# Patient Record
Sex: MALE | Race: WHITE | NOT HISPANIC OR LATINO
[De-identification: names, ages, dates, MRNs, and addresses within clinical notes are randomized per-mention and may not be internally consistent; named-entity substitution may affect disease eponyms.]

---

## 2019-10-30 PROBLEM — Z00.00 ENCOUNTER FOR PREVENTIVE HEALTH EXAMINATION: Status: ACTIVE | Noted: 2019-10-30

## 2020-07-06 ENCOUNTER — APPOINTMENT (OUTPATIENT)
Dept: FAMILY MEDICINE | Facility: CLINIC | Age: 25
End: 2020-07-06

## 2022-03-18 ENCOUNTER — TRANSCRIPTION ENCOUNTER (OUTPATIENT)
Age: 27
End: 2022-03-18

## 2022-03-19 ENCOUNTER — INPATIENT (INPATIENT)
Facility: HOSPITAL | Age: 27
LOS: 1 days | Discharge: HOME | DRG: 853 | End: 2022-03-21
Attending: SURGERY | Admitting: SURGERY
Payer: COMMERCIAL

## 2022-03-19 ENCOUNTER — RESULT REVIEW (OUTPATIENT)
Age: 27
End: 2022-03-19

## 2022-03-19 VITALS
SYSTOLIC BLOOD PRESSURE: 109 MMHG | DIASTOLIC BLOOD PRESSURE: 69 MMHG | HEIGHT: 69 IN | TEMPERATURE: 99 F | HEART RATE: 93 BPM | OXYGEN SATURATION: 96 % | WEIGHT: 220.02 LBS | RESPIRATION RATE: 20 BRPM

## 2022-03-19 DIAGNOSIS — K35.80 UNSPECIFIED ACUTE APPENDICITIS: ICD-10-CM

## 2022-03-19 LAB
ALBUMIN SERPL ELPH-MCNC: 5.1 G/DL — SIGNIFICANT CHANGE UP (ref 3.3–5.2)
ALP SERPL-CCNC: 115 U/L — SIGNIFICANT CHANGE UP (ref 40–120)
ALT FLD-CCNC: 31 U/L — SIGNIFICANT CHANGE UP
ANION GAP SERPL CALC-SCNC: 17 MMOL/L — SIGNIFICANT CHANGE UP (ref 5–17)
APPEARANCE UR: CLEAR — SIGNIFICANT CHANGE UP
APTT BLD: 25.4 SEC — LOW (ref 27.5–35.5)
AST SERPL-CCNC: 18 U/L — SIGNIFICANT CHANGE UP
BACTERIA # UR AUTO: ABNORMAL
BASOPHILS # BLD AUTO: 0.04 K/UL — SIGNIFICANT CHANGE UP (ref 0–0.2)
BASOPHILS NFR BLD AUTO: 0.2 % — SIGNIFICANT CHANGE UP (ref 0–2)
BILIRUB SERPL-MCNC: 1.2 MG/DL — SIGNIFICANT CHANGE UP (ref 0.4–2)
BILIRUB UR-MCNC: NEGATIVE — SIGNIFICANT CHANGE UP
BLD GP AB SCN SERPL QL: SIGNIFICANT CHANGE UP
BUN SERPL-MCNC: 13.6 MG/DL — SIGNIFICANT CHANGE UP (ref 8–20)
CALCIUM SERPL-MCNC: 10.6 MG/DL — HIGH (ref 8.6–10.2)
CHLORIDE SERPL-SCNC: 99 MMOL/L — SIGNIFICANT CHANGE UP (ref 98–107)
CO2 SERPL-SCNC: 24 MMOL/L — SIGNIFICANT CHANGE UP (ref 22–29)
COLOR SPEC: YELLOW — SIGNIFICANT CHANGE UP
CREAT SERPL-MCNC: 0.99 MG/DL — SIGNIFICANT CHANGE UP (ref 0.5–1.3)
DIFF PNL FLD: NEGATIVE — SIGNIFICANT CHANGE UP
EGFR: 108 ML/MIN/1.73M2 — SIGNIFICANT CHANGE UP
EOSINOPHIL # BLD AUTO: 0.02 K/UL — SIGNIFICANT CHANGE UP (ref 0–0.5)
EOSINOPHIL NFR BLD AUTO: 0.1 % — SIGNIFICANT CHANGE UP (ref 0–6)
EPI CELLS # UR: NEGATIVE — SIGNIFICANT CHANGE UP
GLUCOSE SERPL-MCNC: 162 MG/DL — HIGH (ref 70–99)
GLUCOSE UR QL: NEGATIVE MG/DL — SIGNIFICANT CHANGE UP
HCT VFR BLD CALC: 48.2 % — SIGNIFICANT CHANGE UP (ref 39–50)
HGB BLD-MCNC: 16.2 G/DL — SIGNIFICANT CHANGE UP (ref 13–17)
IMM GRANULOCYTES NFR BLD AUTO: 0.4 % — SIGNIFICANT CHANGE UP (ref 0–1.5)
INR BLD: 1.28 RATIO — HIGH (ref 0.88–1.16)
KETONES UR-MCNC: NEGATIVE — SIGNIFICANT CHANGE UP
LEUKOCYTE ESTERASE UR-ACNC: NEGATIVE — SIGNIFICANT CHANGE UP
LIDOCAIN IGE QN: 25 U/L — SIGNIFICANT CHANGE UP (ref 22–51)
LYMPHOCYTES # BLD AUTO: 12.6 % — LOW (ref 13–44)
LYMPHOCYTES # BLD AUTO: 2.1 K/UL — SIGNIFICANT CHANGE UP (ref 1–3.3)
MCHC RBC-ENTMCNC: 31.8 PG — SIGNIFICANT CHANGE UP (ref 27–34)
MCHC RBC-ENTMCNC: 33.6 GM/DL — SIGNIFICANT CHANGE UP (ref 32–36)
MCV RBC AUTO: 94.7 FL — SIGNIFICANT CHANGE UP (ref 80–100)
MONOCYTES # BLD AUTO: 0.89 K/UL — SIGNIFICANT CHANGE UP (ref 0–0.9)
MONOCYTES NFR BLD AUTO: 5.3 % — SIGNIFICANT CHANGE UP (ref 2–14)
NEUTROPHILS # BLD AUTO: 13.56 K/UL — HIGH (ref 1.8–7.4)
NEUTROPHILS NFR BLD AUTO: 81.4 % — HIGH (ref 43–77)
NITRITE UR-MCNC: NEGATIVE — SIGNIFICANT CHANGE UP
PH UR: 6 — SIGNIFICANT CHANGE UP (ref 5–8)
PLATELET # BLD AUTO: 348 K/UL — SIGNIFICANT CHANGE UP (ref 150–400)
POTASSIUM SERPL-MCNC: 4.2 MMOL/L — SIGNIFICANT CHANGE UP (ref 3.5–5.3)
POTASSIUM SERPL-SCNC: 4.2 MMOL/L — SIGNIFICANT CHANGE UP (ref 3.5–5.3)
PROT SERPL-MCNC: 8.5 G/DL — SIGNIFICANT CHANGE UP (ref 6.6–8.7)
PROT UR-MCNC: 15 MG/DL
PROTHROM AB SERPL-ACNC: 14.9 SEC — HIGH (ref 10.5–13.4)
RAPID RVP RESULT: SIGNIFICANT CHANGE UP
RBC # BLD: 5.09 M/UL — SIGNIFICANT CHANGE UP (ref 4.2–5.8)
RBC # FLD: 12.1 % — SIGNIFICANT CHANGE UP (ref 10.3–14.5)
RBC CASTS # UR COMP ASSIST: NEGATIVE /HPF — SIGNIFICANT CHANGE UP (ref 0–4)
SARS-COV-2 RNA SPEC QL NAA+PROBE: SIGNIFICANT CHANGE UP
SODIUM SERPL-SCNC: 140 MMOL/L — SIGNIFICANT CHANGE UP (ref 135–145)
SP GR SPEC: 1.01 — SIGNIFICANT CHANGE UP (ref 1.01–1.02)
UROBILINOGEN FLD QL: NEGATIVE MG/DL — SIGNIFICANT CHANGE UP
WBC # BLD: 16.68 K/UL — HIGH (ref 3.8–10.5)
WBC # FLD AUTO: 16.68 K/UL — HIGH (ref 3.8–10.5)
WBC UR QL: SIGNIFICANT CHANGE UP /HPF (ref 0–5)

## 2022-03-19 PROCEDURE — 99285 EMERGENCY DEPT VISIT HI MDM: CPT

## 2022-03-19 PROCEDURE — 88304 TISSUE EXAM BY PATHOLOGIST: CPT | Mod: 26

## 2022-03-19 PROCEDURE — 74177 CT ABD & PELVIS W/CONTRAST: CPT | Mod: 26,MA

## 2022-03-19 DEVICE — STAPLER COVIDIEN TRI-STAPLE CURVED 45MM TAN RELOAD: Type: IMPLANTABLE DEVICE | Status: FUNCTIONAL

## 2022-03-19 RX ORDER — PIPERACILLIN AND TAZOBACTAM 4; .5 G/20ML; G/20ML
3.38 INJECTION, POWDER, LYOPHILIZED, FOR SOLUTION INTRAVENOUS EVERY 8 HOURS
Refills: 0 | Status: DISCONTINUED | OUTPATIENT
Start: 2022-03-19 | End: 2022-03-21

## 2022-03-19 RX ORDER — ENOXAPARIN SODIUM 100 MG/ML
40 INJECTION SUBCUTANEOUS EVERY 24 HOURS
Refills: 0 | Status: DISCONTINUED | OUTPATIENT
Start: 2022-03-19 | End: 2022-03-21

## 2022-03-19 RX ORDER — KETOROLAC TROMETHAMINE 30 MG/ML
15 SYRINGE (ML) INJECTION EVERY 6 HOURS
Refills: 0 | Status: DISCONTINUED | OUTPATIENT
Start: 2022-03-19 | End: 2022-03-21

## 2022-03-19 RX ORDER — HYDROMORPHONE HYDROCHLORIDE 2 MG/ML
0.5 INJECTION INTRAMUSCULAR; INTRAVENOUS; SUBCUTANEOUS EVERY 6 HOURS
Refills: 0 | Status: DISCONTINUED | OUTPATIENT
Start: 2022-03-19 | End: 2022-03-21

## 2022-03-19 RX ORDER — MORPHINE SULFATE 50 MG/1
4 CAPSULE, EXTENDED RELEASE ORAL ONCE
Refills: 0 | Status: DISCONTINUED | OUTPATIENT
Start: 2022-03-19 | End: 2022-03-19

## 2022-03-19 RX ORDER — LAMOTRIGINE 25 MG/1
300 TABLET, ORALLY DISINTEGRATING ORAL DAILY
Refills: 0 | Status: DISCONTINUED | OUTPATIENT
Start: 2022-03-19 | End: 2022-03-21

## 2022-03-19 RX ORDER — HYDROMORPHONE HYDROCHLORIDE 2 MG/ML
1 INJECTION INTRAMUSCULAR; INTRAVENOUS; SUBCUTANEOUS EVERY 6 HOURS
Refills: 0 | Status: DISCONTINUED | OUTPATIENT
Start: 2022-03-19 | End: 2022-03-21

## 2022-03-19 RX ORDER — SODIUM CHLORIDE 9 MG/ML
1000 INJECTION, SOLUTION INTRAVENOUS
Refills: 0 | Status: DISCONTINUED | OUTPATIENT
Start: 2022-03-19 | End: 2022-03-21

## 2022-03-19 RX ORDER — HYDROMORPHONE HYDROCHLORIDE 2 MG/ML
0.5 INJECTION INTRAMUSCULAR; INTRAVENOUS; SUBCUTANEOUS
Refills: 0 | Status: DISCONTINUED | OUTPATIENT
Start: 2022-03-19 | End: 2022-03-19

## 2022-03-19 RX ORDER — OXCARBAZEPINE 300 MG/1
450 TABLET, FILM COATED ORAL DAILY
Refills: 0 | Status: DISCONTINUED | OUTPATIENT
Start: 2022-03-19 | End: 2022-03-19

## 2022-03-19 RX ORDER — OXCARBAZEPINE 300 MG/1
450 TABLET, FILM COATED ORAL
Qty: 0 | Refills: 0 | DISCHARGE

## 2022-03-19 RX ORDER — DESVENLAFAXINE 50 MG/1
150 TABLET, EXTENDED RELEASE ORAL
Qty: 0 | Refills: 0 | DISCHARGE

## 2022-03-19 RX ORDER — PIPERACILLIN AND TAZOBACTAM 4; .5 G/20ML; G/20ML
3.38 INJECTION, POWDER, LYOPHILIZED, FOR SOLUTION INTRAVENOUS ONCE
Refills: 0 | Status: COMPLETED | OUTPATIENT
Start: 2022-03-19 | End: 2022-03-19

## 2022-03-19 RX ORDER — OXCARBAZEPINE 300 MG/1
150 TABLET, FILM COATED ORAL ONCE
Refills: 0 | Status: DISCONTINUED | OUTPATIENT
Start: 2022-03-19 | End: 2022-03-21

## 2022-03-19 RX ORDER — LAMOTRIGINE 25 MG/1
300 TABLET, ORALLY DISINTEGRATING ORAL
Qty: 0 | Refills: 0 | DISCHARGE

## 2022-03-19 RX ORDER — FENTANYL CITRATE 50 UG/ML
25 INJECTION INTRAVENOUS
Refills: 0 | Status: DISCONTINUED | OUTPATIENT
Start: 2022-03-19 | End: 2022-03-19

## 2022-03-19 RX ORDER — ACETAMINOPHEN 500 MG
975 TABLET ORAL EVERY 6 HOURS
Refills: 0 | Status: DISCONTINUED | OUTPATIENT
Start: 2022-03-19 | End: 2022-03-21

## 2022-03-19 RX ORDER — OXCARBAZEPINE 300 MG/1
150 TABLET, FILM COATED ORAL ONCE
Refills: 0 | Status: COMPLETED | OUTPATIENT
Start: 2022-03-20 | End: 2022-03-20

## 2022-03-19 RX ORDER — SODIUM CHLORIDE 9 MG/ML
1000 INJECTION, SOLUTION INTRAVENOUS
Refills: 0 | Status: DISCONTINUED | OUTPATIENT
Start: 2022-03-19 | End: 2022-03-19

## 2022-03-19 RX ORDER — HYDROMORPHONE HYDROCHLORIDE 2 MG/ML
0.5 INJECTION INTRAMUSCULAR; INTRAVENOUS; SUBCUTANEOUS EVERY 4 HOURS
Refills: 0 | Status: DISCONTINUED | OUTPATIENT
Start: 2022-03-19 | End: 2022-03-19

## 2022-03-19 RX ORDER — KETOROLAC TROMETHAMINE 30 MG/ML
15 SYRINGE (ML) INJECTION ONCE
Refills: 0 | Status: DISCONTINUED | OUTPATIENT
Start: 2022-03-19 | End: 2022-03-19

## 2022-03-19 RX ORDER — ONDANSETRON 8 MG/1
4 TABLET, FILM COATED ORAL ONCE
Refills: 0 | Status: COMPLETED | OUTPATIENT
Start: 2022-03-19 | End: 2022-03-19

## 2022-03-19 RX ORDER — HYDROMORPHONE HYDROCHLORIDE 2 MG/ML
1 INJECTION INTRAMUSCULAR; INTRAVENOUS; SUBCUTANEOUS EVERY 4 HOURS
Refills: 0 | Status: DISCONTINUED | OUTPATIENT
Start: 2022-03-19 | End: 2022-03-19

## 2022-03-19 RX ORDER — SENNA PLUS 8.6 MG/1
2 TABLET ORAL AT BEDTIME
Refills: 0 | Status: DISCONTINUED | OUTPATIENT
Start: 2022-03-19 | End: 2022-03-21

## 2022-03-19 RX ORDER — LIDOCAINE 4 G/100G
1 CREAM TOPICAL DAILY
Refills: 0 | Status: DISCONTINUED | OUTPATIENT
Start: 2022-03-19 | End: 2022-03-21

## 2022-03-19 RX ORDER — SODIUM CHLORIDE 9 MG/ML
1000 INJECTION INTRAMUSCULAR; INTRAVENOUS; SUBCUTANEOUS ONCE
Refills: 0 | Status: COMPLETED | OUTPATIENT
Start: 2022-03-19 | End: 2022-03-19

## 2022-03-19 RX ORDER — BREXPIPRAZOLE 0.25 MG/1
1 TABLET ORAL
Qty: 0 | Refills: 0 | DISCHARGE

## 2022-03-19 RX ORDER — ONDANSETRON 8 MG/1
4 TABLET, FILM COATED ORAL EVERY 6 HOURS
Refills: 0 | Status: DISCONTINUED | OUTPATIENT
Start: 2022-03-19 | End: 2022-03-21

## 2022-03-19 RX ORDER — LANOLIN ALCOHOL/MO/W.PET/CERES
3 CREAM (GRAM) TOPICAL AT BEDTIME
Refills: 0 | Status: DISCONTINUED | OUTPATIENT
Start: 2022-03-19 | End: 2022-03-21

## 2022-03-19 RX ORDER — HYDROMORPHONE HYDROCHLORIDE 2 MG/ML
0.4 INJECTION INTRAMUSCULAR; INTRAVENOUS; SUBCUTANEOUS
Refills: 0 | Status: DISCONTINUED | OUTPATIENT
Start: 2022-03-19 | End: 2022-03-19

## 2022-03-19 RX ORDER — OXCARBAZEPINE 300 MG/1
300 TABLET, FILM COATED ORAL ONCE
Refills: 0 | Status: COMPLETED | OUTPATIENT
Start: 2022-03-20 | End: 2022-03-20

## 2022-03-19 RX ORDER — SODIUM CHLORIDE 9 MG/ML
2000 INJECTION, SOLUTION INTRAVENOUS ONCE
Refills: 0 | Status: DISCONTINUED | OUTPATIENT
Start: 2022-03-19 | End: 2022-03-19

## 2022-03-19 RX ORDER — TRAMADOL HYDROCHLORIDE 50 MG/1
50 TABLET ORAL EVERY 6 HOURS
Refills: 0 | Status: DISCONTINUED | OUTPATIENT
Start: 2022-03-19 | End: 2022-03-21

## 2022-03-19 RX ORDER — ACETAMINOPHEN 500 MG
650 TABLET ORAL EVERY 6 HOURS
Refills: 0 | Status: DISCONTINUED | OUTPATIENT
Start: 2022-03-19 | End: 2022-03-19

## 2022-03-19 RX ORDER — ACETAMINOPHEN 500 MG
650 TABLET ORAL ONCE
Refills: 0 | Status: COMPLETED | OUTPATIENT
Start: 2022-03-19 | End: 2022-03-19

## 2022-03-19 RX ADMIN — SODIUM CHLORIDE 125 MILLILITER(S): 9 INJECTION, SOLUTION INTRAVENOUS at 22:55

## 2022-03-19 RX ADMIN — Medication 15 MILLIGRAM(S): at 13:47

## 2022-03-19 RX ADMIN — PIPERACILLIN AND TAZOBACTAM 25 GRAM(S): 4; .5 INJECTION, POWDER, LYOPHILIZED, FOR SOLUTION INTRAVENOUS at 14:11

## 2022-03-19 RX ADMIN — PIPERACILLIN AND TAZOBACTAM 25 GRAM(S): 4; .5 INJECTION, POWDER, LYOPHILIZED, FOR SOLUTION INTRAVENOUS at 21:43

## 2022-03-19 RX ADMIN — HYDROMORPHONE HYDROCHLORIDE 0.5 MILLIGRAM(S): 2 INJECTION INTRAMUSCULAR; INTRAVENOUS; SUBCUTANEOUS at 14:30

## 2022-03-19 RX ADMIN — SODIUM CHLORIDE 125 MILLILITER(S): 9 INJECTION, SOLUTION INTRAVENOUS at 15:28

## 2022-03-19 RX ADMIN — HYDROMORPHONE HYDROCHLORIDE 0.4 MILLIGRAM(S): 2 INJECTION INTRAMUSCULAR; INTRAVENOUS; SUBCUTANEOUS at 15:27

## 2022-03-19 RX ADMIN — OXCARBAZEPINE 450 MILLIGRAM(S): 300 TABLET, FILM COATED ORAL at 13:48

## 2022-03-19 RX ADMIN — Medication 3 MILLIGRAM(S): at 22:55

## 2022-03-19 RX ADMIN — HYDROMORPHONE HYDROCHLORIDE 1 MILLIGRAM(S): 2 INJECTION INTRAMUSCULAR; INTRAVENOUS; SUBCUTANEOUS at 21:01

## 2022-03-19 RX ADMIN — Medication 15 MILLIGRAM(S): at 22:56

## 2022-03-19 RX ADMIN — HYDROMORPHONE HYDROCHLORIDE 1 MILLIGRAM(S): 2 INJECTION INTRAMUSCULAR; INTRAVENOUS; SUBCUTANEOUS at 19:42

## 2022-03-19 RX ADMIN — Medication 15 MILLIGRAM(S): at 23:22

## 2022-03-19 RX ADMIN — SENNA PLUS 2 TABLET(S): 8.6 TABLET ORAL at 21:40

## 2022-03-19 RX ADMIN — Medication 650 MILLIGRAM(S): at 08:51

## 2022-03-19 RX ADMIN — Medication 975 MILLIGRAM(S): at 17:44

## 2022-03-19 RX ADMIN — ENOXAPARIN SODIUM 40 MILLIGRAM(S): 100 INJECTION SUBCUTANEOUS at 21:40

## 2022-03-19 RX ADMIN — LAMOTRIGINE 300 MILLIGRAM(S): 25 TABLET, ORALLY DISINTEGRATING ORAL at 13:49

## 2022-03-19 RX ADMIN — Medication 15 MILLIGRAM(S): at 04:00

## 2022-03-19 RX ADMIN — Medication 975 MILLIGRAM(S): at 13:47

## 2022-03-19 RX ADMIN — Medication 15 MILLIGRAM(S): at 17:44

## 2022-03-19 RX ADMIN — LIDOCAINE 1 PATCH: 4 CREAM TOPICAL at 13:58

## 2022-03-19 RX ADMIN — ONDANSETRON 4 MILLIGRAM(S): 8 TABLET, FILM COATED ORAL at 04:00

## 2022-03-19 RX ADMIN — HYDROMORPHONE HYDROCHLORIDE 0.4 MILLIGRAM(S): 2 INJECTION INTRAMUSCULAR; INTRAVENOUS; SUBCUTANEOUS at 16:00

## 2022-03-19 RX ADMIN — SODIUM CHLORIDE 1000 MILLILITER(S): 9 INJECTION INTRAMUSCULAR; INTRAVENOUS; SUBCUTANEOUS at 04:00

## 2022-03-19 RX ADMIN — Medication 650 MILLIGRAM(S): at 08:17

## 2022-03-19 RX ADMIN — Medication 30 MILLILITER(S): at 23:20

## 2022-03-19 RX ADMIN — HYDROMORPHONE HYDROCHLORIDE 0.5 MILLIGRAM(S): 2 INJECTION INTRAMUSCULAR; INTRAVENOUS; SUBCUTANEOUS at 14:02

## 2022-03-19 RX ADMIN — Medication 975 MILLIGRAM(S): at 23:22

## 2022-03-19 RX ADMIN — Medication 975 MILLIGRAM(S): at 22:55

## 2022-03-19 RX ADMIN — PIPERACILLIN AND TAZOBACTAM 200 GRAM(S): 4; .5 INJECTION, POWDER, LYOPHILIZED, FOR SOLUTION INTRAVENOUS at 07:06

## 2022-03-19 NOTE — BRIEF OPERATIVE NOTE - OPERATION/FINDINGS
Perforated gangrenous acute appendicitis with early phlegmon. Appendix stapled at healthy base with endoGIA 60 gold. Copious irrigation and suction of abdomen and pelvis

## 2022-03-19 NOTE — ED PROVIDER NOTE - CLINICAL SUMMARY MEDICAL DECISION MAKING FREE TEXT BOX
26 year old male with pmhx of alcohol abuse, bipolar presents c/o abdominal pain x 2 days.   - CT   - labs  -pain control   - urine

## 2022-03-19 NOTE — H&P ADULT - ASSESSMENT
27 y/o M with clinical and radiographic findings consistent with acute appendicitis. Hemodynamically stable, afebrile.    Plan:  Admit to Surgery, Dr. Marc  OR today for lap, possible open, appendectomy  Received Zosyn in ED  NPO/IVF  Pain meds  DVT ppx: SCDs

## 2022-03-19 NOTE — BRIEF OPERATIVE NOTE - NSICDXBRIEFPOSTOP_GEN_ALL_CORE_FT
POST-OP DIAGNOSIS:  Acute appendicitis with perforation and localized peritonitis 19-Mar-2022 12:00:33  Roberto Carlos Hauser

## 2022-03-19 NOTE — H&P ADULT - NSHPPHYSICALEXAM_GEN_ALL_CORE
General: NAD, AOx3, uncomfortable on examination  HEENT: PERRLA, EOMI, moist mucous membranes  Neck: supple, nontender  Cardiovascular: heart RRR, no murmurs  Respiratory: no respiratory distress, CTAB  Abdomen: soft, moderate RLQ and suprapubic tenderness to palpation, nondistended. No guarding or rebound. Normal bowel sounds.  Extremities: no peripheral edema. Normal ROM.  Integumentary: warm, no rash  Neuro: no motor or sensory deficits
none

## 2022-03-19 NOTE — ED PROVIDER NOTE - ATTENDING CONTRIBUTION TO CARE
25yo male with pmh of bipolar and alcohol abuse presents with RLQ pain/n/v. Pt with appendicitis, abx, surgical admission

## 2022-03-19 NOTE — H&P ADULT - HISTORY OF PRESENT ILLNESS
25 y/o M h/o depression presents to ED with lower abdominal pain. Patient states it woke him from sleep 2 nights ago, describe as constant pain. Pain continued to worsen and again woke him from sleep last night after which he presented to ED. Nausea and vomiting. No fever, but notes chills. No chest pain or dyspnea.     PMH: depression  PSH: none  Medications: Pristiq, Rexulti, Lamictal, Trileptal  Allergies: NKDA, NKA  Social: no tobacco use, former alcohol use (in rehab, no alcohol for 3 months), medical marijuana use

## 2022-03-19 NOTE — PATIENT PROFILE ADULT - FALL HARM RISK - HARM RISK INTERVENTIONS

## 2022-03-19 NOTE — ED ADULT TRIAGE NOTE - CHIEF COMPLAINT QUOTE
Patient ambulated into ED with steady gait, Pt c/o RLQ abd pain started last night, nausea. Pt is diaphoretic in triage.

## 2022-03-19 NOTE — ED PROVIDER NOTE - OBJECTIVE STATEMENT
26 year old male with pmhx of alcohol abuse, bipolar presents c/o abdominal pain x 2 days. Pt states pain started last night diffusely, now localized to RLQ. he admits to nausea and vomiting. describes pain as sharp stabbing radiating to groin. He denies chest pain, sob, fever/chills, dysuria, hematuria, trauma/injury

## 2022-03-19 NOTE — CHART NOTE - NSCHARTNOTEFT_GEN_A_CORE
Post operative check     Subjective: Patient is s/p lap appendectomy for acute appendicitis with perforation and localized peritonitis. He was seen and evaluated at bedside. He has some abdominal pain, much improved since pre operatively. He is voiding spontaneously, tolerating liquids, and plans to eat dinner. Denies fever, chills, chest pain, palpitations, SOB, difficulty breathing, dysuria, hematuria, N/V/D.     STATUS POST:  Laparoscopic appendectomy     POST OPERATIVE DAY #: 0    MEDICATIONS  (STANDING):  acetaminophen     Tablet .. 975 milliGRAM(s) Oral every 6 hours  enoxaparin Injectable 40 milliGRAM(s) SubCutaneous every 24 hours  ketorolac   Injectable 15 milliGRAM(s) IV Push every 6 hours  lactated ringers. 1000 milliLiter(s) (125 mL/Hr) IV Continuous <Continuous>  lamoTRIgine 300 milliGRAM(s) Oral daily  lidocaine   4% Patch 1 Patch Transdermal daily  OXcarbazepine Oral Liquid - Peds 450 milliGRAM(s) Oral daily  piperacillin/tazobactam IVPB.. 3.375 Gram(s) IV Intermittent every 8 hours  senna 2 Tablet(s) Oral at bedtime    MEDICATIONS  (PRN):  HYDROmorphone  Injectable 0.4 milliGRAM(s) IV Push every 3 hours PRN severe or breakthrough pain  HYDROmorphone  Injectable 1 milliGRAM(s) IV Push every 4 hours PRN Severe Pain (7 - 10)  HYDROmorphone  Injectable 0.5 milliGRAM(s) IV Push every 4 hours PRN Moderate Pain (4 - 6)  ondansetron Injectable 4 milliGRAM(s) IV Push every 6 hours PRN Nausea and/or Vomiting  traMADol 50 milliGRAM(s) Oral every 6 hours PRN mild to moderate pain      Vital Signs Last 24 Hrs  T(C): 37.2 (19 Mar 2022 15:24), Max: 39.3 (19 Mar 2022 07:30)  T(F): 99 (19 Mar 2022 15:24), Max: 102.8 (19 Mar 2022 07:30)  HR: 109 (19 Mar 2022 15:24) (77 - 110)  BP: 123/70 (19 Mar 2022 15:24) (105/54 - 131/79)  BP(mean): --  RR: 20 (19 Mar 2022 15:24) (12 - 20)  SpO2: 96% (19 Mar 2022 15:24) (96% - 100%)    Physical Exam:    Constitutional: NAD  HEENT: PERRL, EOMI  Neck: No JVD, FROM without pain  Respiratory: Respirations non-labored, no accessory muscle use  Gastrointestinal: Soft, + tenderness RLQ, incisions c/d/i   Extremities: No peripheral edema, No cyanosis  Neurological: A&O x 3; without gross deficit  Musculoskeletal: No joint pain, swelling, deformity, or point tenderness; no limitation of movement      LABS:                        16.2   16.68 )-----------( 348      ( 19 Mar 2022 03:57 )             48.2     03-    140  |  99  |  13.6  ----------------------------<  162<H>  4.2   |  24.0  |  0.99    Ca    10.6<H>      19 Mar 2022 03:57    TPro  8.5  /  Alb  5.1  /  TBili  1.2  /  DBili  x   /  AST  18  /  ALT  31  /  AlkPhos  115  03-19    PT/INR - ( 19 Mar 2022 05:06 )   PT: 14.9 sec;   INR: 1.28 ratio         PTT - ( 19 Mar 2022 05:06 )  PTT:25.4 sec  Urinalysis Basic - ( 19 Mar 2022 15:29 )    Color: Yellow / Appearance: Clear / S.015 / pH: x  Gluc: x / Ketone: Negative  / Bili: Negative / Urobili: Negative mg/dL   Blood: x / Protein: 15 mg/dL / Nitrite: Negative   Leuk Esterase: Negative / RBC: Negative /HPF / WBC 0-2 /HPF   Sq Epi: x / Non Sq Epi: Negative / Bacteria: Many      A: Patient is a 25 yo M s/p laparoscopic appendectomy     Plan:   - Continue IV abx   - Regular diet   - Pain control   - Encourage ambulation, OOB   - DVT ppx

## 2022-03-20 ENCOUNTER — TRANSCRIPTION ENCOUNTER (OUTPATIENT)
Age: 27
End: 2022-03-20

## 2022-03-20 LAB
ANION GAP SERPL CALC-SCNC: 12 MMOL/L — SIGNIFICANT CHANGE UP (ref 5–17)
BASOPHILS # BLD AUTO: 0.02 K/UL — SIGNIFICANT CHANGE UP (ref 0–0.2)
BASOPHILS NFR BLD AUTO: 0.2 % — SIGNIFICANT CHANGE UP (ref 0–2)
BUN SERPL-MCNC: 9.3 MG/DL — SIGNIFICANT CHANGE UP (ref 8–20)
CALCIUM SERPL-MCNC: 9.1 MG/DL — SIGNIFICANT CHANGE UP (ref 8.6–10.2)
CHLORIDE SERPL-SCNC: 101 MMOL/L — SIGNIFICANT CHANGE UP (ref 98–107)
CO2 SERPL-SCNC: 25 MMOL/L — SIGNIFICANT CHANGE UP (ref 22–29)
CREAT SERPL-MCNC: 0.72 MG/DL — SIGNIFICANT CHANGE UP (ref 0.5–1.3)
EGFR: 129 ML/MIN/1.73M2 — SIGNIFICANT CHANGE UP
EOSINOPHIL # BLD AUTO: 0.01 K/UL — SIGNIFICANT CHANGE UP (ref 0–0.5)
EOSINOPHIL NFR BLD AUTO: 0.1 % — SIGNIFICANT CHANGE UP (ref 0–6)
GLUCOSE SERPL-MCNC: 108 MG/DL — HIGH (ref 70–99)
HCT VFR BLD CALC: 43.5 % — SIGNIFICANT CHANGE UP (ref 39–50)
HGB BLD-MCNC: 14.1 G/DL — SIGNIFICANT CHANGE UP (ref 13–17)
IMM GRANULOCYTES NFR BLD AUTO: 0.6 % — SIGNIFICANT CHANGE UP (ref 0–1.5)
LYMPHOCYTES # BLD AUTO: 1.67 K/UL — SIGNIFICANT CHANGE UP (ref 1–3.3)
LYMPHOCYTES # BLD AUTO: 16.1 % — SIGNIFICANT CHANGE UP (ref 13–44)
MAGNESIUM SERPL-MCNC: 2.1 MG/DL — SIGNIFICANT CHANGE UP (ref 1.6–2.6)
MCHC RBC-ENTMCNC: 31.8 PG — SIGNIFICANT CHANGE UP (ref 27–34)
MCHC RBC-ENTMCNC: 32.4 GM/DL — SIGNIFICANT CHANGE UP (ref 32–36)
MCV RBC AUTO: 98 FL — SIGNIFICANT CHANGE UP (ref 80–100)
MONOCYTES # BLD AUTO: 0.52 K/UL — SIGNIFICANT CHANGE UP (ref 0–0.9)
MONOCYTES NFR BLD AUTO: 5 % — SIGNIFICANT CHANGE UP (ref 2–14)
NEUTROPHILS # BLD AUTO: 8.12 K/UL — HIGH (ref 1.8–7.4)
NEUTROPHILS NFR BLD AUTO: 78 % — HIGH (ref 43–77)
PHOSPHATE SERPL-MCNC: 2.4 MG/DL — SIGNIFICANT CHANGE UP (ref 2.4–4.7)
PLATELET # BLD AUTO: 213 K/UL — SIGNIFICANT CHANGE UP (ref 150–400)
POTASSIUM SERPL-MCNC: 3.8 MMOL/L — SIGNIFICANT CHANGE UP (ref 3.5–5.3)
POTASSIUM SERPL-SCNC: 3.8 MMOL/L — SIGNIFICANT CHANGE UP (ref 3.5–5.3)
RBC # BLD: 4.44 M/UL — SIGNIFICANT CHANGE UP (ref 4.2–5.8)
RBC # FLD: 12.3 % — SIGNIFICANT CHANGE UP (ref 10.3–14.5)
SODIUM SERPL-SCNC: 138 MMOL/L — SIGNIFICANT CHANGE UP (ref 135–145)
WBC # BLD: 10.4 K/UL — SIGNIFICANT CHANGE UP (ref 3.8–10.5)
WBC # FLD AUTO: 10.4 K/UL — SIGNIFICANT CHANGE UP (ref 3.8–10.5)

## 2022-03-20 RX ORDER — TRAMADOL HYDROCHLORIDE 50 MG/1
1 TABLET ORAL
Qty: 8 | Refills: 0
Start: 2022-03-20

## 2022-03-20 RX ORDER — ACETAMINOPHEN 500 MG
3 TABLET ORAL
Qty: 0 | Refills: 0 | DISCHARGE
Start: 2022-03-20

## 2022-03-20 RX ORDER — POTASSIUM PHOSPHATE, MONOBASIC POTASSIUM PHOSPHATE, DIBASIC 236; 224 MG/ML; MG/ML
15 INJECTION, SOLUTION INTRAVENOUS ONCE
Refills: 0 | Status: COMPLETED | OUTPATIENT
Start: 2022-03-20 | End: 2022-03-20

## 2022-03-20 RX ORDER — METRONIDAZOLE 500 MG
1 TABLET ORAL
Qty: 21 | Refills: 0
Start: 2022-03-20 | End: 2022-03-26

## 2022-03-20 RX ADMIN — Medication 3 MILLIGRAM(S): at 21:49

## 2022-03-20 RX ADMIN — Medication 975 MILLIGRAM(S): at 20:23

## 2022-03-20 RX ADMIN — Medication 975 MILLIGRAM(S): at 21:15

## 2022-03-20 RX ADMIN — Medication 15 MILLIGRAM(S): at 20:24

## 2022-03-20 RX ADMIN — Medication 30 MILLILITER(S): at 14:51

## 2022-03-20 RX ADMIN — ENOXAPARIN SODIUM 40 MILLIGRAM(S): 100 INJECTION SUBCUTANEOUS at 21:49

## 2022-03-20 RX ADMIN — POTASSIUM PHOSPHATE, MONOBASIC POTASSIUM PHOSPHATE, DIBASIC 62.5 MILLIMOLE(S): 236; 224 INJECTION, SOLUTION INTRAVENOUS at 18:32

## 2022-03-20 RX ADMIN — PIPERACILLIN AND TAZOBACTAM 25 GRAM(S): 4; .5 INJECTION, POWDER, LYOPHILIZED, FOR SOLUTION INTRAVENOUS at 14:44

## 2022-03-20 RX ADMIN — SENNA PLUS 2 TABLET(S): 8.6 TABLET ORAL at 21:49

## 2022-03-20 RX ADMIN — Medication 15 MILLIGRAM(S): at 21:15

## 2022-03-20 RX ADMIN — Medication 15 MILLIGRAM(S): at 05:51

## 2022-03-20 RX ADMIN — PIPERACILLIN AND TAZOBACTAM 25 GRAM(S): 4; .5 INJECTION, POWDER, LYOPHILIZED, FOR SOLUTION INTRAVENOUS at 22:43

## 2022-03-20 RX ADMIN — Medication 15 MILLIGRAM(S): at 12:15

## 2022-03-20 RX ADMIN — SODIUM CHLORIDE 125 MILLILITER(S): 9 INJECTION, SOLUTION INTRAVENOUS at 06:22

## 2022-03-20 RX ADMIN — LIDOCAINE 1 PATCH: 4 CREAM TOPICAL at 04:24

## 2022-03-20 RX ADMIN — OXCARBAZEPINE 150 MILLIGRAM(S): 300 TABLET, FILM COATED ORAL at 05:51

## 2022-03-20 RX ADMIN — Medication 975 MILLIGRAM(S): at 11:44

## 2022-03-20 RX ADMIN — ONDANSETRON 4 MILLIGRAM(S): 8 TABLET, FILM COATED ORAL at 18:31

## 2022-03-20 RX ADMIN — TRAMADOL HYDROCHLORIDE 50 MILLIGRAM(S): 50 TABLET ORAL at 15:52

## 2022-03-20 RX ADMIN — HYDROMORPHONE HYDROCHLORIDE 1 MILLIGRAM(S): 2 INJECTION INTRAMUSCULAR; INTRAVENOUS; SUBCUTANEOUS at 04:22

## 2022-03-20 RX ADMIN — Medication 975 MILLIGRAM(S): at 05:51

## 2022-03-20 RX ADMIN — Medication 975 MILLIGRAM(S): at 12:22

## 2022-03-20 RX ADMIN — Medication 30 MILLILITER(S): at 23:44

## 2022-03-20 RX ADMIN — TRAMADOL HYDROCHLORIDE 50 MILLIGRAM(S): 50 TABLET ORAL at 14:53

## 2022-03-20 RX ADMIN — PIPERACILLIN AND TAZOBACTAM 25 GRAM(S): 4; .5 INJECTION, POWDER, LYOPHILIZED, FOR SOLUTION INTRAVENOUS at 05:50

## 2022-03-20 RX ADMIN — LIDOCAINE 1 PATCH: 4 CREAM TOPICAL at 11:45

## 2022-03-20 RX ADMIN — Medication 15 MILLIGRAM(S): at 11:45

## 2022-03-20 RX ADMIN — LAMOTRIGINE 300 MILLIGRAM(S): 25 TABLET, ORALLY DISINTEGRATING ORAL at 11:45

## 2022-03-20 RX ADMIN — OXCARBAZEPINE 300 MILLIGRAM(S): 300 TABLET, FILM COATED ORAL at 14:44

## 2022-03-20 NOTE — DISCHARGE NOTE PROVIDER - NSDCMRMEDTOKEN_GEN_ALL_CORE_FT
acetaminophen 325 mg oral tablet: 3 tab(s) orally every 6 hours  LaMICtal: 300 milligram(s) orally once a day  Pristiq: 150 milligram(s) orally once a day  Rexulti 1 mg oral tablet: 1 tab(s) orally once a day  Trileptal: 450 milligram(s) orally once a day   acetaminophen 325 mg oral tablet: 3 tab(s) orally every 6 hours  LaMICtal: 300 milligram(s) orally once a day  levoFLOXacin 500 mg oral tablet: 1 tab(s) orally 2 times a day  metroNIDAZOLE 500 mg oral tablet: 1 tab(s) orally 3 times a day MDD:3  Pristiq: 150 milligram(s) orally once a day  Rexulti 1 mg oral tablet: 1 tab(s) orally once a day  traMADol 50 mg oral tablet: 1 tab(s) orally every 6 hours, As needed, mild to moderate pain MDD:4  Trileptal: 450 milligram(s) orally once a day

## 2022-03-20 NOTE — DISCHARGE NOTE PROVIDER - HOSPITAL COURSE
26M admitted 3/19 with acute appendicitis, underwent laparoscopic appendectomy found to have gangrenous acute appendicitis with early perforation, otherwise uncomplicated. POD1 pt pain improved, tolerated some PO, AVSS, wbc normalized to 10 from 16.

## 2022-03-20 NOTE — DISCHARGE NOTE PROVIDER - NSDCFUADDINST_GEN_ALL_CORE_FT
Do not lift more than 10 lbs for 8 weeks.  May shower.  Do not submerge in water for 8 weeks.  Follow up with your surgeon within 2 weeks.

## 2022-03-20 NOTE — DISCHARGE NOTE PROVIDER - CARE PROVIDER_API CALL
Franky Marc)  Surgery  [address], [address]  [place] [address]  Phone: [phone]  Fax: [phone]  Follow Up Time: 1 week

## 2022-03-21 ENCOUNTER — TRANSCRIPTION ENCOUNTER (OUTPATIENT)
Age: 27
End: 2022-03-21

## 2022-03-21 VITALS
TEMPERATURE: 98 F | SYSTOLIC BLOOD PRESSURE: 117 MMHG | HEART RATE: 97 BPM | OXYGEN SATURATION: 97 % | DIASTOLIC BLOOD PRESSURE: 73 MMHG

## 2022-03-21 LAB
ANION GAP SERPL CALC-SCNC: 13 MMOL/L — SIGNIFICANT CHANGE UP (ref 5–17)
BUN SERPL-MCNC: 7.6 MG/DL — LOW (ref 8–20)
CALCIUM SERPL-MCNC: 9 MG/DL — SIGNIFICANT CHANGE UP (ref 8.6–10.2)
CHLORIDE SERPL-SCNC: 100 MMOL/L — SIGNIFICANT CHANGE UP (ref 98–107)
CO2 SERPL-SCNC: 27 MMOL/L — SIGNIFICANT CHANGE UP (ref 22–29)
CREAT SERPL-MCNC: 0.78 MG/DL — SIGNIFICANT CHANGE UP (ref 0.5–1.3)
EGFR: 126 ML/MIN/1.73M2 — SIGNIFICANT CHANGE UP
GLUCOSE SERPL-MCNC: 128 MG/DL — HIGH (ref 70–99)
HCT VFR BLD CALC: 38.3 % — LOW (ref 39–50)
HGB BLD-MCNC: 12.8 G/DL — LOW (ref 13–17)
MAGNESIUM SERPL-MCNC: 2.4 MG/DL — SIGNIFICANT CHANGE UP (ref 1.6–2.6)
MCHC RBC-ENTMCNC: 32 PG — SIGNIFICANT CHANGE UP (ref 27–34)
MCHC RBC-ENTMCNC: 33.4 GM/DL — SIGNIFICANT CHANGE UP (ref 32–36)
MCV RBC AUTO: 95.8 FL — SIGNIFICANT CHANGE UP (ref 80–100)
PHOSPHATE SERPL-MCNC: 2.1 MG/DL — LOW (ref 2.4–4.7)
PLATELET # BLD AUTO: 211 K/UL — SIGNIFICANT CHANGE UP (ref 150–400)
POTASSIUM SERPL-MCNC: 4.1 MMOL/L — SIGNIFICANT CHANGE UP (ref 3.5–5.3)
POTASSIUM SERPL-SCNC: 4.1 MMOL/L — SIGNIFICANT CHANGE UP (ref 3.5–5.3)
RBC # BLD: 4 M/UL — LOW (ref 4.2–5.8)
RBC # FLD: 12 % — SIGNIFICANT CHANGE UP (ref 10.3–14.5)
SODIUM SERPL-SCNC: 140 MMOL/L — SIGNIFICANT CHANGE UP (ref 135–145)
WBC # BLD: 10.52 K/UL — HIGH (ref 3.8–10.5)
WBC # FLD AUTO: 10.52 K/UL — HIGH (ref 3.8–10.5)

## 2022-03-21 PROCEDURE — 86901 BLOOD TYPING SEROLOGIC RH(D): CPT

## 2022-03-21 PROCEDURE — 85027 COMPLETE CBC AUTOMATED: CPT

## 2022-03-21 PROCEDURE — 36415 COLL VENOUS BLD VENIPUNCTURE: CPT

## 2022-03-21 PROCEDURE — 81001 URINALYSIS AUTO W/SCOPE: CPT

## 2022-03-21 PROCEDURE — 83735 ASSAY OF MAGNESIUM: CPT

## 2022-03-21 PROCEDURE — 85730 THROMBOPLASTIN TIME PARTIAL: CPT

## 2022-03-21 PROCEDURE — C1889: CPT

## 2022-03-21 PROCEDURE — 99285 EMERGENCY DEPT VISIT HI MDM: CPT

## 2022-03-21 PROCEDURE — 85025 COMPLETE CBC W/AUTO DIFF WBC: CPT

## 2022-03-21 PROCEDURE — C9399: CPT

## 2022-03-21 PROCEDURE — 74177 CT ABD & PELVIS W/CONTRAST: CPT | Mod: MA

## 2022-03-21 PROCEDURE — 96375 TX/PRO/DX INJ NEW DRUG ADDON: CPT

## 2022-03-21 PROCEDURE — 86900 BLOOD TYPING SEROLOGIC ABO: CPT

## 2022-03-21 PROCEDURE — 0225U NFCT DS DNA&RNA 21 SARSCOV2: CPT

## 2022-03-21 PROCEDURE — 96374 THER/PROPH/DIAG INJ IV PUSH: CPT

## 2022-03-21 PROCEDURE — 85610 PROTHROMBIN TIME: CPT

## 2022-03-21 PROCEDURE — 88304 TISSUE EXAM BY PATHOLOGIST: CPT

## 2022-03-21 PROCEDURE — 83690 ASSAY OF LIPASE: CPT

## 2022-03-21 PROCEDURE — 86850 RBC ANTIBODY SCREEN: CPT

## 2022-03-21 PROCEDURE — 80053 COMPREHEN METABOLIC PANEL: CPT

## 2022-03-21 PROCEDURE — 84100 ASSAY OF PHOSPHORUS: CPT

## 2022-03-21 PROCEDURE — 80048 BASIC METABOLIC PNL TOTAL CA: CPT

## 2022-03-21 RX ORDER — OXCARBAZEPINE 300 MG/1
150 TABLET, FILM COATED ORAL
Refills: 0 | Status: DISCONTINUED | OUTPATIENT
Start: 2022-03-21 | End: 2022-03-21

## 2022-03-21 RX ORDER — METRONIDAZOLE 500 MG
1 TABLET ORAL
Qty: 21 | Refills: 0
Start: 2022-03-21 | End: 2022-03-27

## 2022-03-21 RX ORDER — TRAMADOL HYDROCHLORIDE 50 MG/1
1 TABLET ORAL
Qty: 8 | Refills: 0
Start: 2022-03-21 | End: 2022-03-22

## 2022-03-21 RX ORDER — OXCARBAZEPINE 300 MG/1
300 TABLET, FILM COATED ORAL ONCE
Refills: 0 | Status: DISCONTINUED | OUTPATIENT
Start: 2022-03-21 | End: 2022-03-21

## 2022-03-21 RX ORDER — ACETAMINOPHEN 500 MG
3 TABLET ORAL
Qty: 60 | Refills: 0
Start: 2022-03-21 | End: 2022-03-25

## 2022-03-21 RX ADMIN — ONDANSETRON 4 MILLIGRAM(S): 8 TABLET, FILM COATED ORAL at 07:40

## 2022-03-21 RX ADMIN — Medication 15 MILLIGRAM(S): at 04:12

## 2022-03-21 RX ADMIN — Medication 975 MILLIGRAM(S): at 07:33

## 2022-03-21 RX ADMIN — Medication 975 MILLIGRAM(S): at 01:25

## 2022-03-21 RX ADMIN — Medication 15 MILLIGRAM(S): at 07:33

## 2022-03-21 RX ADMIN — LIDOCAINE 1 PATCH: 4 CREAM TOPICAL at 06:22

## 2022-03-21 RX ADMIN — Medication 30 MILLILITER(S): at 06:26

## 2022-03-21 RX ADMIN — PIPERACILLIN AND TAZOBACTAM 25 GRAM(S): 4; .5 INJECTION, POWDER, LYOPHILIZED, FOR SOLUTION INTRAVENOUS at 06:08

## 2022-03-21 RX ADMIN — OXCARBAZEPINE 150 MILLIGRAM(S): 300 TABLET, FILM COATED ORAL at 09:23

## 2022-03-21 RX ADMIN — Medication 975 MILLIGRAM(S): at 04:12

## 2022-03-21 RX ADMIN — Medication 15 MILLIGRAM(S): at 01:25

## 2022-03-21 NOTE — DISCHARGE NOTE NURSING/CASE MANAGEMENT/SOCIAL WORK - PATIENT PORTAL LINK FT
You can access the FollowMyHealth Patient Portal offered by [facility] by registering at the following website: [email or web address]. By joining [facility]’s FollowMyHealth portal, you will also be able to view your health information using other applications (apps) compatible with our system.

## 2022-03-21 NOTE — PROGRESS NOTE ADULT - ASSESSMENT
[id number] laparoscopic appendectomy for perforated gangrenous acute appendicitis, nontoxic HDnl  -Pain control  -Diet, monitor bowel function  -Cont Zosyn, trend leukocytosis, fevers  -DVT PPx  -OOB Ambulation
Assessment and Plan:  26y y/o Male s/p Laparoscopic appendectomy for gangrenous appendectomy.  Been on IV antibiotics, leucocytosis resolved    Lap appendectomy    , POD # 2    - Pain control  - regular diet  - continue IV antibiotics for a total of 5 days  - Encourage Incentive Spirometer use  - encourage ambulation  - Continue chemical and mechanical DVT prophylaxis

## 2022-03-21 NOTE — DISCHARGE NOTE NURSING/CASE MANAGEMENT/SOCIAL WORK - NSDCPEFALRISK_GEN_ALL_CORE
For information on Fall & Injury Prevention, visit: [email or web address] OR  [email or web address] OR  https://www.cdc.gov/steadi/patient.html

## 2022-03-21 NOTE — PROGRESS NOTE ADULT - SUBJECTIVE AND OBJECTIVE BOX
General Surgery Progress Note    Post Op Day#: 1    24 hr events/Subjective:     No acute issues overnight  Pain controlled with medications  Has decreased appetite  Ampulated    Procedure:  Laparoscopic appendectomy      Vital Signs Last 24 Hrs  T(C): 37.1 (21 Mar 2022 00:12), Max: 37.4 (20 Mar 2022 20:11)  T(F): 98.7 (21 Mar 2022 00:12), Max: 99.3 (20 Mar 2022 20:11)  HR: 102 (21 Mar 2022 00:12) (96 - 116)  BP: 110/68 (21 Mar 2022 00:12) (104/68 - 131/79)  BP(mean): --  RR: 20 (21 Mar 2022 00:12) (18 - 20)  SpO2: 98% (21 Mar 2022 00:12) (96% - 99%)  Height (cm): 175.3 (03-19 @ 02:36)  Weight (kg): 99.8 (03-19 @ 02:36)  BMI (kg/m2): 32.5 (03-19 @ 02:36)  BSA (m2): 2.15 (03-19 @ 02:36)  I&O's Summary    19 Mar 2022 07:01  -  20 Mar 2022 07:00  --------------------------------------------------------  IN: 0 mL / OUT: 300 mL / NET: -300 mL    20 Mar 2022 07:01  -  21 Mar 2022 00:23  --------------------------------------------------------  IN: 350 mL / OUT: 400 mL / NET: -50 mL      I&O's Detail    19 Mar 2022 07:01  -  20 Mar 2022 07:00  --------------------------------------------------------  IN:  Total IN: 0 mL    OUT:    Voided (mL): 300 mL  Total OUT: 300 mL    Total NET: -300 mL      20 Mar 2022 07:01  -  21 Mar 2022 00:23  --------------------------------------------------------  IN:    IV PiggyBack: 250 mL    IV PiggyBack: 100 mL  Total IN: 350 mL    OUT:    Voided (mL): 400 mL  Total OUT: 400 mL    Total NET: -50 mL          Physical Exam:    General:  Appears stated age, well-nourished, no distress  Chest:  clear breath sounds  Cardiovascular:  Regular rate & rhythm  Abdomen:  Soft, incisions clean, dry intact, tenderness around incisions, no rebound or guarding  Skin:  No rash  Neuro/Psych:  Alert, oriented to time, place and person                           14.1   10.40 )-----------( 213      ( 20 Mar 2022 07:27 )             43.5       03-20    138  |  101  |  9.3  ----------------------------<  108<H>  3.8   |  25.0  |  0.72    Ca    9.1      20 Mar 2022 07:27  Phos  2.4     03-20  Mg     2.1     03-20    TPro  8.5  /  Alb  5.1  /  TBili  1.2  /  DBili  x   /  AST  18  /  ALT  31  /  AlkPhos  115  03-19      acetaminophen     Tablet .. milliGRAM(s) Oral every 6 hours  aluminum hydroxide/magnesium hydroxide/simethicone Suspension milliLiter(s) Oral every 4 hours PRN  enoxaparin Injectable milliGRAM(s) SubCutaneous every 24 hours  HYDROmorphone  Injectable milliGRAM(s) IV Push every 6 hours PRN  HYDROmorphone  Injectable milliGRAM(s) IV Push every 6 hours PRN  ketorolac   Injectable milliGRAM(s) IV Push every 6 hours  lactated ringers. milliLiter(s) IV Continuous <Continuous>  lamoTRIgine milliGRAM(s) Oral daily  lidocaine   4% Patch Patch Transdermal daily  melatonin milliGRAM(s) Oral at bedtime  ondansetron Injectable milliGRAM(s) IV Push every 6 hours PRN  OXcarbazepine Suspension milliGRAM(s) Oral once  piperacillin/tazobactam IVPB.. Gram(s) IV Intermittent every 8 hours  senna Tablet(s) Oral at bedtime  traMADol milliGRAM(s) Oral every 6 hours PRN              
feels better   afebriel  anb benign   inc c/d   doing well   dc home today on abx
INTERVAL HPI/OVERNIGHT EVENTS:  Patient was seen and examined at bedside this AM.  No acute events overnight. Postoperative pain improving, tolerating some liquids, AVSS      MEDICATIONS  (STANDING):  acetaminophen     Tablet .. 975 milliGRAM(s) Oral every 6 hours  enoxaparin Injectable 40 milliGRAM(s) SubCutaneous every 24 hours  ketorolac   Injectable 15 milliGRAM(s) IV Push every 6 hours  lactated ringers. 1000 milliLiter(s) (125 mL/Hr) IV Continuous <Continuous>  lamoTRIgine 300 milliGRAM(s) Oral daily  lidocaine   4% Patch 1 Patch Transdermal daily  melatonin 3 milliGRAM(s) Oral at bedtime  OXcarbazepine Suspension 300 milliGRAM(s) Oral once  OXcarbazepine Suspension 150 milliGRAM(s) Oral once  piperacillin/tazobactam IVPB.. 3.375 Gram(s) IV Intermittent every 8 hours  senna 2 Tablet(s) Oral at bedtime    MEDICATIONS  (PRN):  aluminum hydroxide/magnesium hydroxide/simethicone Suspension 30 milliLiter(s) Oral every 4 hours PRN Dyspepsia  HYDROmorphone  Injectable 0.5 milliGRAM(s) IV Push every 6 hours PRN Moderate Pain (4 - 6)  HYDROmorphone  Injectable 1 milliGRAM(s) IV Push every 6 hours PRN Severe Pain (7 - 10)  ondansetron Injectable 4 milliGRAM(s) IV Push every 6 hours PRN Nausea and/or Vomiting  traMADol 50 milliGRAM(s) Oral every 6 hours PRN mild to moderate pain      Vital Signs Last 24 Hrs  T(C): 37.2 (20 Mar 2022 04:51), Max: 39.3 (19 Mar 2022 07:30)  T(F): 98.9 (20 Mar 2022 04:51), Max: 102.8 (19 Mar 2022 07:30)  HR: 96 (20 Mar 2022 04:51) (77 - 110)  BP: 104/68 (20 Mar 2022 04:51) (98/63 - 131/79)  BP(mean): --  RR: 18 (20 Mar 2022 04:51) (12 - 20)  SpO2: 97% (20 Mar 2022 04:51) (95% - 100%)    Physical Exam:  Gen: NAD  Neurological:  No sensory/motor deficits  HEENT: PERRLA, EOMI  Respiratory: Breath Sounds equal & CTA bilaterally, no accessory muscle use  Cardiovascular: Regular rate & rhythm, normal S1, S2; no murmurs, gallops or rubs  Gastrointestinal: Soft, non-tender, nondistended, incisional TTP incisions c/d/i, no rebound/guarding/rigidity  Vascular: Equal and normal pulses: 2+ peripheral pulses throughout  Musculoskeletal: No joint pain, swelling or deformity; no limitation of movement  Skin: No rashes      I&O's Detail    19 Mar 2022 07:01  -  20 Mar 2022 07:00  --------------------------------------------------------  IN:  Total IN: 0 mL    OUT:    Voided (mL): 300 mL  Total OUT: 300 mL    Total NET: -300 mL          LABS:                        16.2   16.68 )-----------( 348      ( 19 Mar 2022 03:57 )             48.2     03-19    140  |  99  |  13.6  ----------------------------<  162<H>  4.2   |  24.0  |  0.99    Ca    10.6<H>      19 Mar 2022 03:57    TPro  8.5  /  Alb  5.1  /  TBili  1.2  /  DBili  x   /  AST  18  /  ALT  31  /  AlkPhos  115  03-19    PT/INR - ( 19 Mar 2022 05:06 )   PT: 14.9 sec;   INR: 1.28 ratio         PTT - ( 19 Mar 2022 05:06 )  PTT:25.4 sec  Urinalysis Basic - ( 19 Mar 2022 15:29 )    Color: Yellow / Appearance: Clear / S.015 / pH: x  Gluc: x / Ketone: Negative  / Bili: Negative / Urobili: Negative mg/dL   Blood: x / Protein: 15 mg/dL / Nitrite: Negative   Leuk Esterase: Negative / RBC: Negative /HPF / WBC 0-2 /HPF   Sq Epi: x / Non Sq Epi: Negative / Bacteria: Many        RADIOLOGY & ADDITIONAL STUDIES:

## 2022-03-29 LAB — SURGICAL PATHOLOGY STUDY: SIGNIFICANT CHANGE UP

## 2022-03-29 NOTE — CDI QUERY NOTE - NSCDIOTHERTXTBX_GEN_ALL_CORE_HH
Can you please clarify the status of sepsis that was mentioned in the brief op note?  A.	Sepsis present on admission  B.	Early sepsis present on admission  C.	Sepsis ruled out  D.	Other, please specify  E.	Not clinically significant    Supporting documentation:    WBC Count:  WBC Count: 10.52 K/uL (03.21.22 @ 07:43)   WBC Count: 10.40 K/uL (03.20.22 @ 07:27)   WBC Count: 16.68 K/uL (03.19.22 @ 03:57)       ED ADULT Flow Sheet [Charted Location: [facility] ED] [Authored: 19-Mar-2022 07:30]  Vital Signs    Temperature  Temp (F): 102.8 Degrees F  Temp (C) Temp (C): 39.3 Degrees C  Temp site Temp Site: oral    Heart Rate  Heart Rate Heart Rate (beats/min): 110 /min        Brief Operative Note [Charted Location: [address] [address]] [Authored: 19-Mar-2022 11:59]  Operative Findings:  • Operative Findings	Perforated gangrenous acute appendicitis with early phlegmon. Appendix stapled at healthy base with endoGIA 60 gold. Copious irrigation and suction of abdomen and pelvis     Evidence of Infection or Abscess:  • Evidence of infection or abscess identified at the start or during the surgical procedure:	Yes  • Select type of infection:	abscess  feculent peritonitis/sepsis  • Please describe:	purulent/feculent perforated acute appendicitis      Discharge Note Provider [Charted Location: [address] [address]] [Authored: 20-Mar-2022 08:29]  Hospital Course	  26M admitted 3/19 with acute appendicitis, underwent laparoscopic appendectomy found to have gangrenous acute appendicitis with early perforation, otherwise uncomplicated. POD1 pt pain improved, tolerated some PO, AVSS, wbc normalized to 10 from 16.         piperacillin/tazobactam IVPB.. 3.375 Gram(s) IV Intermittent every 8 hours, given 3/19-21/22   levoFLOXacin 500 mg oral tablet: 1 tab(s) orally 2 times a day,  discharge medication. Patient had a temp of 102.8, HR of 110, WBC of 16.68 on presentation and during OR was found to have abscess purulent/feculent perforated acute appendicitis and sepsis was noted in the brief op note only. Can you please clarify the POA status of sepsis?   A.	Sepsis present on admission  B.	Early sepsis present on admission  D.	Other, please specify  E.	Not clinically significant    Supporting documentation:    WBC Count:  WBC Count: 10.52 K/uL (03.21.22 @ 07:43)   WBC Count: 10.40 K/uL (03.20.22 @ 07:27)   WBC Count: 16.68 K/uL (03.19.22 @ 03:57)       ED ADULT Flow Sheet [Charted Location: [facility] ED] [Authored: 19-Mar-2022 07:30]  Vital Signs    Temperature  Temp (F): 102.8 Degrees F  Temp (C) Temp (C): 39.3 Degrees C  Temp site Temp Site: oral    Heart Rate  Heart Rate Heart Rate (beats/min): 110 /min        Brief Operative Note [Charted Location: [facility] [address] [address]] [Authored: 19-Mar-2022 11:59]  Operative Findings:  • Operative Findings	Perforated gangrenous acute appendicitis with early phlegmon. Appendix stapled at healthy base with endoGIA 60 gold. Copious irrigation and suction of abdomen and pelvis     Evidence of Infection or Abscess:  • Evidence of infection or abscess identified at the start or during the surgical procedure:	Yes  • Select type of infection:	abscess  feculent peritonitis/sepsis  • Please describe:	purulent/feculent perforated acute appendicitis      Discharge Note Provider [Charted Location: [facility] [address] [address] 01] [Authored: 20-Mar-2022 08:29]  Hospital Course	  26M admitted 3/19 with acute appendicitis, underwent laparoscopic appendectomy found to have gangrenous acute appendicitis with early perforation, otherwise uncomplicated. POD1 pt pain improved, tolerated some PO, AVSS, wbc normalized to 10 from 16.         piperacillin/tazobactam IVPB.. 3.375 Gram(s) IV Intermittent every 8 hours, given 3/19-21/22   levoFLOXacin 500 mg oral tablet: 1 tab(s) orally 2 times a day,  discharge medication.

## 2022-03-30 NOTE — CHART NOTE - NSCHARTNOTEFT_GEN_A_CORE
Please note upon review of chart the following diagnosis exists for this patient:    1. Sepsis, present on admit    Supporting documentation:    WBC Count:  WBC Count: 10.52 K/uL (03.21.22 @ 07:43)   WBC Count: 10.40 K/uL (03.20.22 @ 07:27)   WBC Count: 16.68 K/uL (03.19.22 @ 03:57)       ED ADULT Flow Sheet [Charted Location: [facility] ED] [Authored: 19-Mar-2022 07:30]  Vital Signs    Temperature  Temp (F): 102.8 Degrees F  Temp (C) Temp (C): 39.3 Degrees C  Temp site Temp Site: oral    Heart Rate  Heart Rate Heart Rate (beats/min): 110 /min        Brief Operative Note [Charted Location: [address] [address]] [Authored: 19-Mar-2022 11:59]  Operative Findings:  • Operative Findings	Perforated gangrenous acute appendicitis with early phlegmon. Appendix stapled at healthy base with endoGIA 60 gold. Copious irrigation and suction of abdomen and pelvis     Evidence of Infection or Abscess:  • Evidence of infection or abscess identified at the start or during the surgical procedure:	Yes  • Select type of infection:	abscess  feculent peritonitis/sepsis  • Please describe:	purulent/feculent perforated acute appendicitis      Discharge Note Provider [Charted Location: [address] [address]] [Authored: 20-Mar-2022 08:29]  Hospital Course	  26M admitted 3/19 with acute appendicitis, underwent laparoscopic appendectomy found to have gangrenous acute appendicitis with early perforation, otherwise uncomplicated. POD1 pt pain improved, tolerated some PO, AVSS, wbc normalized to 10 from 16.         piperacillin/tazobactam IVPB.. 3.375 Gram(s) IV Intermittent every 8 hours, given 3/19-21/22   levoFLOXacin 500 mg oral tablet: 1 tab(s) orally 2 times a day,  discharge medication.

## 2022-06-09 ENCOUNTER — NON-APPOINTMENT (OUTPATIENT)
Age: 27
End: 2022-06-09

## 2023-07-19 ENCOUNTER — NON-APPOINTMENT (OUTPATIENT)
Age: 28
End: 2023-07-19

## 2024-06-26 NOTE — ED PROVIDER NOTE - RESPIRATORY, MLM
The patient is Watcher - Medium risk of patient condition declining or worsening    Shift Goals  Clinical Goals: Stable neuro exam; pain management; patient safety  Patient Goals: Pain control  Family Goals: ROSA - no family present    Progress made toward(s) clinical / shift goals:    Problem: Pain - Standard  Goal: Alleviation of pain or a reduction in pain to the patient’s comfort goal  Outcome: Progressing     Problem: Fall Risk  Goal: Patient will remain free from falls  Outcome: Progressing     Problem: Safety - Medical Restraint  Goal: Remains free of injury from restraints (Restraint for Interference with Medical Device)  Outcome: Progressing  Goal: Free from restraint(s) (Restraint for Interference with Medical Device)  Outcome: Progressing     Problem: Neuro Status  Goal: Neuro status will remain stable or improve  Outcome: Progressing       Patient is not progressing towards the following goals:       Breath sounds clear and equal bilaterally.

## 2024-12-23 NOTE — PATIENT PROFILE ADULT - DEAF OR HARD OF HEARING?
Airway  Date/Time: 12/22/2024 6:50 PM  Urgency: Elective    Airway not difficult    General Information and Staff    Patient location during procedure: OR  Anesthesiologist: Sheela Spain MD  Performed: anesthesiologist   Performed by: Sheela Spain MD  Authorized by: Sheela Spain MD      Indications and Patient Condition  Indications for airway management: anesthesia  Sedation level: deep  Preoxygenated: yes  Patient position: sniffing  Mask difficulty assessment: 0 - not attempted    Final Airway Details  Final airway type: supraglottic airway      Successful airway: classic  Size 4       Number of attempts at approach: 1         no